# Patient Record
Sex: FEMALE | Race: WHITE | Employment: FULL TIME | URBAN - METROPOLITAN AREA
[De-identification: names, ages, dates, MRNs, and addresses within clinical notes are randomized per-mention and may not be internally consistent; named-entity substitution may affect disease eponyms.]

---

## 2024-08-13 ENCOUNTER — OFFICE VISIT (OUTPATIENT)
Dept: OTOLARYNGOLOGY | Facility: CLINIC | Age: 34
End: 2024-08-13
Payer: MEDICAID

## 2024-08-13 VITALS — BODY MASS INDEX: 17.27 KG/M2 | WEIGHT: 110 LBS | HEIGHT: 67 IN

## 2024-08-13 DIAGNOSIS — J34.2 NASAL SEPTAL DEVIATION: ICD-10-CM

## 2024-08-13 DIAGNOSIS — R59.1 LYMPHADENOPATHY: ICD-10-CM

## 2024-08-13 DIAGNOSIS — J34.3 HYPERTROPHY OF NASAL TURBINATES: ICD-10-CM

## 2024-08-13 DIAGNOSIS — J39.2 THROAT IRRITATION: Primary | ICD-10-CM

## 2024-08-13 PROCEDURE — 99203 OFFICE O/P NEW LOW 30 MIN: CPT | Performed by: OTOLARYNGOLOGY

## 2024-08-13 PROCEDURE — 31575 DIAGNOSTIC LARYNGOSCOPY: CPT | Performed by: OTOLARYNGOLOGY

## 2024-08-13 PROCEDURE — 3008F BODY MASS INDEX DOCD: CPT | Performed by: OTOLARYNGOLOGY

## 2024-08-13 PROCEDURE — 1036F TOBACCO NON-USER: CPT | Performed by: OTOLARYNGOLOGY

## 2024-08-13 RX ORDER — CICLOPIROX 80 MG/ML
SOLUTION TOPICAL
COMMUNITY
Start: 2024-08-07

## 2024-08-13 RX ORDER — TERBINAFINE HYDROCHLORIDE 250 MG/1
1 TABLET ORAL
COMMUNITY
Start: 2024-08-07

## 2024-08-13 RX ORDER — HYDROCORTISONE 25 MG/G
CREAM TOPICAL 2 TIMES DAILY
COMMUNITY
Start: 2024-07-30 | End: 2024-08-20

## 2024-08-13 RX ORDER — FLUCONAZOLE 150 MG/1
TABLET ORAL
COMMUNITY
Start: 2024-07-18

## 2024-08-13 NOTE — PROGRESS NOTES
Subjective   Patient ID: Curtis Moore is a 34 y.o. female who presents for No chief complaint on file..  HPI  This 34-year-old female is being seen today for an evaluation of her upper aerodigestive tract.  According to history provided she has had some throat irritation sporadically over a number of weeks.  She states that at times she has some glandular swelling that occurs in her neck that then dissipates.  She has not been treated by others for infection.  She has had some GI related issues which are being followed by other providers.  There is been no history of bacterial infections in the upper aerodigestive tract.  She denies sinus infections or sinus drainage.  Has had no difficulties with hearing or with balance.  She was concerned since she does have a uncle who had a lymphoma and she has concerns about issues of that nature.  She is a daily user of marijuana.  She does not use tobacco.  Review of Systems  A 12 point ROS has been reviewed and are negative for complaint except what is stated in the history of present illness and/or past medical history as noted in the EMR    Active Ambulatory Problems     Diagnosis Date Noted    No Active Ambulatory Problems     Resolved Ambulatory Problems     Diagnosis Date Noted    No Resolved Ambulatory Problems     No Additional Past Medical History         Current Outpatient Medications:     ciclopirox (Penlac) 8 % solution, APPLY ONCE DAILY TO THE AFFECTED AREA, Disp: , Rfl:     fluconazole (Diflucan) 150 mg tablet, TAKE 1 TABLET BY MOUTH NOW. REPEAT IN 72 HOURS, Disp: , Rfl:     hydrocortisone (Anusol-HC) 2.5 % rectal cream, Insert into the rectum twice a day., Disp: , Rfl:     LACTOBACILLUS PARACASEI ORAL, Take 1 capsule by mouth once daily., Disp: , Rfl:     terbinafine (LamISIL) 250 mg tablet, Take 1 tablet (250 mg) by mouth early in the morning.., Disp: , Rfl:      Social History     Tobacco Use    Smoking status: Former     Types: Cigarettes    Smokeless  "tobacco: Never   Substance Use Topics    Alcohol use: Yes   The patient does state that she uses daily marijuana.    History reviewed. No pertinent surgical history.     No family history on file.    No Known Allergies    Height 1.702 m (5' 7\"), weight 49.9 kg (110 lb).    Objective   Physical Exam  Examination:    CONSTITUTIONAL: Alert, in no acute distress, normal pitch/clarity of voice, well-developed, well-nourished, cooperative.  HEAD/FACE: Normocephalic, atraumatic, no tenderness over the sinuses, facial strength and movement symmetric.    SKIN: Good turgor, no rashes, no suspicious lesions, in the head and neck.    EYES: Both eyes have normal extraocular movements with no nystagmus, pupils are equal and reactive to light and accommodation, conjunctiva is clear.    EARS: Both ears are negative for external skin abnormalities, external auditory canals are without lesions or signs of inflammation, tympanic membranes are intact and are of normal color and texture, no effusions are seen, light reflexes normal, no mastoid tenderness is noted to palpation, objective hearing is intact.    NOSE: No external skin lesions are noted, intranasally by anterior rhinoscopy she has anterior deviation of the nasal septum towards the right and some prominence of the nasal turbinates on the left.  There is further deviation noted distally on the left as per fiberoptic exam.  See procedure note    OROPHARYNX/ORAL CAVITY: Mucous membranes of the oropharynx and the oral cavity proper are without lesions or ulcerations, tongue mobility is normal and no lesions are noted, gingiva and alveolar mucosa is intact without lesions, oral mucosa is moist, muscular movement of the palate and gag reflex are normal.  Tonsils are +1 none infected no stones or signs of irritation are noted.  Uvula soft palate floor the mouth lateral borders of the tongue are all within normal limits.    NASOPHARYNX: Mucous membranes are noninflamed and no " secretions or lesions are noted.  As per fiberoptic    LARYNX: No mucosal inflammation or exudates are noted, arytenoids are normal in appearance and mobility, false vocal cords are without lesions as is the remainder of the supraglottic larynx, true vocal folds are mobile without inflammation or obstructions and no masses or lesions are noted in the endolarynx.  As per fiberoptic    NECK: Thin neck with normal architecture , no lymphadenopathy is palpated, neck is supple with full range of motion, thyroid is without swelling or tenderness, trachea is midline, no neck masses are noted.  No abnormalities are noted in the posterior triangle.  Laryngeal complex moves normally.    Lymphatics: No cervical adenopathy or supraclavicular adenopathy noted to palpation.    HEART/VASCULAR: No jugular venous distention is noted, carotid pulsations are intact with a regular rate and rhythm noted,    PULMONARY: Good air movement with normal inspiratory/expiratory effort is noted, no audible wheezing is appreciated.    NEUROLOGIC: Alert and oriented, cranial nerves are grossly intact, gait is normal, sensation in the head and neck is intact,    PSYCH: oriented to person, place and time, normal mood and affect.    EXTREMITIES: No motor dysfunction of the upper and lower extremity is noted.    Patient ID: Curtis Moore is a 34 y.o. female.    Laryngoscopy    Date/Time: 8/13/2024 3:32 PM    Performed by: Gm Penn DMD, MD  Authorized by: Gm ePnn DMD, MD    Consent:     Consent obtained:  Verbal    Consent given by:  Patient    Risks discussed:  Pain    Alternatives discussed:  No treatment and referral  Procedure details:     Indications: direct visualization of the upper aerodigestive tract    Post-procedure details:     Patient tolerance of procedure:  Tolerated well, no immediate complications  Comments:      LARYNGOSCOPY    Indications:      Consent:  The procedure was discussed including the possible risks  and benefits and alternative treatments were discussed with verbal consent obtained.    Procedure:  Topical Wei-Synephrine and lidocaine is applied as a decongestant and anesthetic nasally. A fiberoptic laryngoscope is inserted nasally and the upper aerodigestive tract is examined.    Findings: Intranasally there was no evidence for nasal purulent discharge, nasal polyps or significant obstructions from the septum or turbinates. The nasopharynx was normal showing no signs of abnormalities to the mucous membranes and there was no obstruction to the eustachian tubes. Base of the tongue and vallecula were within normal limits as was the supraglottic larynx, lateral walls of the oropharynx, and the hypopharyngeal mucosa.  The endolarynx revealed normal vocal cord movements which were symmetric and no lesions were noted. There were no secretions pooling in the post cricoid region.    Post procedure: The patient tolerated the procedure well without complications.    Assessment/Plan   Problem List Items Addressed This Visit    None  Visit Diagnoses         Codes    Throat irritation    -  Primary J39.2    Lymphadenopathy     R59.1    Nasal septal deviation     J34.2    Hypertrophy of nasal turbinates     J34.3          I discussed the clinical findings with the patient.  From the standpoint of her exam she had no evidence for inflammation of mucous membrane surfaces.  There did not appear to be any dominant lymphadenopathy in her neck.  She is a very thin neck person and structures nonpathological are noted with normal laryngeal structures normal hyoid bone and no evidence for thyroid enlargement.  Internally on fiberoptic exam of her pharyngeal area was negative for inflammation the larynx showed normal vocal cord movement with no irritation and there was no secretion buildup in the postcricoid region.  Intranasally she does have a nasal septal deviation anteriorly mostly on the right posteriorly on the left.  Turbinate  hypertrophy is noted left more than right.  There was no obstruction in the nasopharynx.  Encouraged her to stop the use of marijuana since it will detrimentally affect lung function as well as irritate mucous membrane surfaces.  Reflux can also occur causing individuals to have throat irritation and mucous entrapment.  She does see GI providers for other issues and they can comment on esophageal function.  Pepcid as a gentle acid inhibitor might be of benefit.  Nasal saline washes using a [employer] sinus rinse kit using purified water to mix the saline would be useful if she notes nasal congestion and drainage.  Swollen lymph nodes associated with throat pain should be checked for infection and she can see her primary care physician or urgent care initially for those areas to be assessed.  If she has any persisting problems that seem to be worsening she needs to come in for secondary exam.       Gm Penn DMD, MD 08/13/24 2:31 PM